# Patient Record
Sex: MALE | Race: WHITE | Employment: OTHER | URBAN - METROPOLITAN AREA
[De-identification: names, ages, dates, MRNs, and addresses within clinical notes are randomized per-mention and may not be internally consistent; named-entity substitution may affect disease eponyms.]

---

## 2020-12-10 ENCOUNTER — OFFICE VISIT (OUTPATIENT)
Dept: INTERNAL MEDICINE | Facility: CLINIC | Age: 40
End: 2020-12-10
Payer: COMMERCIAL

## 2020-12-10 VITALS
WEIGHT: 174.7 LBS | SYSTOLIC BLOOD PRESSURE: 116 MMHG | RESPIRATION RATE: 16 BRPM | HEIGHT: 76 IN | HEART RATE: 89 BPM | TEMPERATURE: 98.2 F | DIASTOLIC BLOOD PRESSURE: 78 MMHG | BODY MASS INDEX: 21.27 KG/M2 | OXYGEN SATURATION: 98 %

## 2020-12-10 DIAGNOSIS — G56.20 ULNAR NEUROPATHY AT ELBOW, UNSPECIFIED LATERALITY: Primary | ICD-10-CM

## 2020-12-10 DIAGNOSIS — N52.9 ERECTILE DYSFUNCTION, UNSPECIFIED ERECTILE DYSFUNCTION TYPE: ICD-10-CM

## 2020-12-10 DIAGNOSIS — Z23 ENCOUNTER FOR IMMUNIZATION: ICD-10-CM

## 2020-12-10 PROCEDURE — 99204 OFFICE O/P NEW MOD 45 MIN: CPT | Mod: 25 | Performed by: INTERNAL MEDICINE

## 2020-12-10 PROCEDURE — 90471 IMMUNIZATION ADMIN: CPT | Performed by: INTERNAL MEDICINE

## 2020-12-10 PROCEDURE — 90686 IIV4 VACC NO PRSV 0.5 ML IM: CPT | Performed by: INTERNAL MEDICINE

## 2020-12-10 PROCEDURE — 90472 IMMUNIZATION ADMIN EACH ADD: CPT | Performed by: INTERNAL MEDICINE

## 2020-12-10 PROCEDURE — 90715 TDAP VACCINE 7 YRS/> IM: CPT | Performed by: INTERNAL MEDICINE

## 2020-12-10 RX ORDER — SILDENAFIL 50 MG/1
50 TABLET, FILM COATED ORAL DAILY PRN
Qty: 30 TABLET | Refills: 1 | Status: SHIPPED | OUTPATIENT
Start: 2020-12-10

## 2020-12-10 ASSESSMENT — MIFFLIN-ST. JEOR: SCORE: 1805.99

## 2020-12-10 NOTE — PATIENT INSTRUCTIONS
"- Look into [place]  - Look into an elbow band, Google \"tennis elbow band\" or \"golf elbow band\"  "

## 2020-12-10 NOTE — PROGRESS NOTES
Subjective     Steffen Colorado is a 40 year old male who presents to clinic today for the following health issues:    HPI   Musculoskeletal problem/pain  Onset/Duration: many years   Description  Location: wrist and elbow - bilateral  Joint Swelling: no  Redness: no  Pain: YES  Warmth: no  Intensity:  mild  Progression of Symptoms:  worsening  Accompanying signs and symptoms:   Fevers: no  Numbness/tingling/weakness: YES, in the fingers   History  Trauma to the area: no  Recent illness:  no  Previous similar problem: YES  Previous evaluation:  YES  Precipitating or alleviating factors:  Aggravating factors include: lifting and overuse  Therapies tried and outcome: PT, ibuprofen     Steffen presents today with to discuss a few complaints. He has had longstanding bilateral elbow pain. Started a few years ago. Was seen in [place] (where he lived at the time). Sent to PT but he didn't have insurance back then, it was expensive, and it didn't really help. He occasionally gets twinges down the medial aspect of both arms in his 4th and 5th fingers. On further questioning, his wrists don't actually hurt. They just get those twinges down from his elbow. He has not dropped anything that he's hold in his hands. He also reports about a one year history of erectile dysfunction. He does still get morning erections. When he tries to obtain an erection for sex, he often cannot. He gets anxious about this which makes it harder to obtain. He has been able to obtain an erection during this time, but was unable to hold it to completion. He has been able to hold erections to completion during masturbation, but not sexual intercourse. He's in a new relationship and would like to have this issue fixed.    History reviewed. No pertinent past medical history.    History reviewed. No pertinent surgical history.      Social History     Tobacco Use     Smoking status: Never Smoker     Smokeless tobacco: Never Used     Tobacco comment: occ cigar  "  Substance Use Topics     Alcohol use: Not on file       Review of Systems   10 point ROS of systems including Constitutional, Eyes, Respiratory, Cardiovascular, Gastroenterology, Genitourinary, Integumentary, Muscularskeletal, Psychiatric were all negative except for pertinent positives noted in my HPI.      Objective    /78   Pulse 89   Temp 98.2  F (36.8  C) (Temporal)   Resp 16   Ht 1.918 m (6' 3.5\")   Wt 79.2 kg (174 lb 11.2 oz)   SpO2 98%   BMI 21.55 kg/m    Body mass index is 21.55 kg/m .  Physical Exam   GENERAL: alert and in no distress.  EYES: conjunctivae/corneas clear. EOMs grossly intact  HENT: NC/AT, facies symmetric. Neck supple. No cervical LAD appreciated. Full beard.  RESP: No iWOB  CV: RRR to DP  GI: NT, ND, without rebound tenderness or guarding  MSK: No edema. No cyanosis or clubbing noted bilaterally in upper and/or lower extremities. TTP in ulnar nerve area bilaterally in each elbow. Strength 5/5 in both hands, arms, and in all fingers.   SKIN: No significant ulcers, lesions, or rashes on the visualized portions of the skin  NEURO: Alert. Oriented. Sensation grossly WNL.    No results found for this or any previous visit (from the past 24 hour(s)).        Assessment & Plan     Ulnar neuropathy at elbow, unspecified laterality  Discussed ulnar nerve anatomy. He also appears to have some \"golfer's elbow\" as well. Discussed banding his forearm. He will get OTC. Correct ibuprofen dosing reviewed. Also prescribed Voltaren gel in case that also gives some relief. If these supportive measures do not help, then will refer to OT. If weakness develops, then neuro.  - diclofenac (VOLTAREN) 1 % topical gel; Apply 2 g topically 2 times daily as needed for moderate pain    Erectile dysfunction, unspecified erectile dysfunction type  Discussed possible causes, including anxiety component. Given his history, I am less concerned about low testosterone. Discussed side effects and black box " "warnings of PDE-5 inhibitors such as sildenafil. He has no cardiac history, is not on any medications, and can exercise vigorously without issue. Discussed contraindication if he were to ever end up on nitrates/nitrites. Discussed the \"start low and go slow\" method of dosing, aiming for the lowest possible dose.  - sildenafil (VIAGRA) 50 MG tablet; Take 1 tablet (50 mg) by mouth daily as needed (sexual desire)    Encounter for immunization  - TDAP VACCINE (Adacel, Boostrix)  [[id number]]  -  FLU VAC PRESRV FREE QUAD SPLIT VIR > 6 MONTHS IM ([id number])        Return in about 6 months (around 6/10/2021) for Physical Exam.    Jameel Urban MD  [facility]    "

## 2021-01-04 ENCOUNTER — HEALTH MAINTENANCE LETTER (OUTPATIENT)
Age: 41
End: 2021-01-04

## 2021-08-02 ENCOUNTER — OFFICE VISIT (OUTPATIENT)
Dept: URGENT CARE | Facility: URGENT CARE | Age: 41
End: 2021-08-02
Payer: COMMERCIAL

## 2021-08-02 VITALS
TEMPERATURE: 98.1 F | DIASTOLIC BLOOD PRESSURE: 88 MMHG | OXYGEN SATURATION: 99 % | SYSTOLIC BLOOD PRESSURE: 141 MMHG | BODY MASS INDEX: 22.79 KG/M2 | HEART RATE: 71 BPM | WEIGHT: 184.8 LBS | RESPIRATION RATE: 16 BRPM

## 2021-08-02 DIAGNOSIS — H57.11 EYE PAIN, RIGHT: ICD-10-CM

## 2021-08-02 DIAGNOSIS — S05.01XA ABRASION OF RIGHT CORNEA, INITIAL ENCOUNTER: Primary | ICD-10-CM

## 2021-08-02 PROCEDURE — 99214 OFFICE O/P EST MOD 30 MIN: CPT | Performed by: PHYSICIAN ASSISTANT

## 2021-08-02 RX ORDER — POLYMYXIN B SULFATE AND TRIMETHOPRIM 1; 10000 MG/ML; [USP'U]/ML
2 SOLUTION OPHTHALMIC EVERY 4 HOURS
Qty: 10 ML | Refills: 0 | Status: SHIPPED | OUTPATIENT
Start: 2021-08-02

## 2021-08-02 RX ORDER — OFLOXACIN 3 MG/ML
2 SOLUTION/ DROPS OPHTHALMIC
Qty: 10 ML | Refills: 0 | Status: SHIPPED | OUTPATIENT
Start: 2021-08-02 | End: 2021-08-09

## 2021-08-03 NOTE — PATIENT INSTRUCTIONS
Patient Education     Corneal Abrasion    You have a scratch or scrape (abrasion) on your cornea. The cornea is the clear part in the front of the eye. This sensitive area is very painful when injured. You may make tears frequently, and your vision may be blurry until the injury heals. You may be sensitive to light.   This part of the body heals quickly. You can expect the pain to go away within 24 to 48 hours. If the abrasion is large or deep, your doctor may apply an eye patch, although this is not always done. An antibiotic ointment or eye drops may also be used to prevent infection.   Numbing drops may be used to relieve the pain temporarily so that your eyes can be examined. But these drops can t be prescribed for home use because that would prevent healing and lead to more serious problems. Also, if you can t feel your eye, there is a chance of accidentally injuring it further without knowing it.   Home care    A cold pack may be applied over the eye (or eye patch) for 20 minutes at a time, to reduce pain. To make a cold pack, put ice cubes in a plastic bag that seals at the top. Wrap the bag in a clean, thin towel or cloth.    You may use acetaminophen or ibuprofen to control pain, unless another pain medicine was prescribed. If you have chronic liver or kidney disease, talk with your healthcare provider before using these medicines. Also talk with your provider if you have ever had a stomach ulcer or gastrointestinal bleeding.    Rest your eyes and don t read until symptoms are gone.    If you use contact lenses, don t wear them until all symptoms are gone.    If your vision is affected by the corneal abrasion or if an eye patch was applied, don t drive a motor vehicle or operate machinery until all symptoms are gone. You may have trouble judging distances using only one eye.    If your eyes are sensitive to light, try wearing sunglasses, or stay indoors until symptoms go away.    Follow-up care  Follow up  with your healthcare provider, or as advised.    If no patch was put on your eye and the pain continues for more than 48 hours, you should have another exam. Contact your healthcare provider to arrange this.    If your eye was patched and you were asked to remove the patch yourself, see your healthcare provider. Contact your healthcare provider if you still have pain after the patch is removed.    If you were given a return appointment for patch removal and re-examination, be sure to keep the appointment. Leaving the patch in place longer than advised could be harmful.  When to seek medical advice  Call your healthcare provider right away if any of these occur.    Eye pain gets worse or does not get better after 24 hours    Discharge from the eye    Redness of the eye or swelling of the eyelids gets worse    Vision gets worse    Symptoms get worse after the abrasion has healed  Tai last reviewed this educational content on 2/1/2020 2000-2021 [employer]. All rights reserved. This information is not intended as a substitute for professional medical care. Always follow your healthcare professional's instructions.
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
## 2021-10-11 ENCOUNTER — HEALTH MAINTENANCE LETTER (OUTPATIENT)
Age: 41
End: 2021-10-11

## 2022-01-30 ENCOUNTER — HEALTH MAINTENANCE LETTER (OUTPATIENT)
Age: 42
End: 2022-01-30

## 2022-09-24 ENCOUNTER — HEALTH MAINTENANCE LETTER (OUTPATIENT)
Age: 42
End: 2022-09-24

## 2023-05-08 ENCOUNTER — HEALTH MAINTENANCE LETTER (OUTPATIENT)
Age: 43
End: 2023-05-08

## 2024-07-14 ENCOUNTER — HEALTH MAINTENANCE LETTER (OUTPATIENT)
Age: 44
End: 2024-07-14